# Patient Record
Sex: FEMALE | Race: WHITE
[De-identification: names, ages, dates, MRNs, and addresses within clinical notes are randomized per-mention and may not be internally consistent; named-entity substitution may affect disease eponyms.]

---

## 2020-09-10 ENCOUNTER — HOSPITAL ENCOUNTER (EMERGENCY)
Dept: HOSPITAL 4 - SED | Age: 25
Discharge: LEFT BEFORE DISCHARGE | End: 2020-09-10
Payer: COMMERCIAL

## 2020-09-10 VITALS — SYSTOLIC BLOOD PRESSURE: 161 MMHG

## 2020-09-10 VITALS — BODY MASS INDEX: 25.2 KG/M2 | WEIGHT: 180 LBS | HEIGHT: 71 IN

## 2020-09-10 DIAGNOSIS — R20.2: ICD-10-CM

## 2020-09-10 DIAGNOSIS — F15.129: Primary | ICD-10-CM

## 2020-09-10 DIAGNOSIS — F12.90: ICD-10-CM

## 2020-09-10 LAB
ALBUMIN SERPL BCP-MCNC: 4.4 G/DL (ref 3.4–4.8)
ALT SERPL W P-5'-P-CCNC: 42 U/L (ref 12–78)
ANION GAP SERPL CALCULATED.3IONS-SCNC: 11 MMOL/L (ref 5–15)
AST SERPL W P-5'-P-CCNC: 75 U/L (ref 10–37)
BASOPHILS # BLD AUTO: 0.1 K/UL (ref 0–0.2)
BASOPHILS NFR BLD AUTO: 0.6 % (ref 0–2)
BILIRUB SERPL-MCNC: 0.9 MG/DL (ref 0–1)
BUN SERPL-MCNC: 21 MG/DL (ref 8–21)
CALCIUM SERPL-MCNC: 9.5 MG/DL (ref 8.4–11)
CHLORIDE SERPL-SCNC: 100 MMOL/L (ref 98–107)
CREAT SERPL-MCNC: 1.17 MG/DL (ref 0.55–1.3)
EOSINOPHIL # BLD AUTO: 0 K/UL (ref 0–0.4)
EOSINOPHIL NFR BLD AUTO: 0.5 % (ref 0–4)
ERYTHROCYTE [DISTWIDTH] IN BLOOD BY AUTOMATED COUNT: 13.8 % (ref 9–15)
GFR SERPL CREATININE-BSD FRML MDRD: 74 ML/MIN (ref 90–?)
GLUCOSE SERPL-MCNC: 103 MG/DL (ref 70–99)
HCT VFR BLD AUTO: 38.8 % (ref 36–48)
HGB BLD-MCNC: 13.4 G/DL (ref 12–16)
LYMPHOCYTES # BLD AUTO: 3.2 K/UL (ref 1–5.5)
LYMPHOCYTES NFR BLD AUTO: 34.5 % (ref 20.5–51.5)
MCH RBC QN AUTO: 30 PG (ref 27–31)
MCHC RBC AUTO-ENTMCNC: 35 % (ref 32–36)
MCV RBC AUTO: 85 FL (ref 79–98)
MONOCYTES # BLD MANUAL: 1 K/UL (ref 0–1)
MONOCYTES # BLD MANUAL: 11 % (ref 1.7–9.3)
NEUTROPHILS # BLD AUTO: 4.9 K/UL (ref 1.8–7.7)
NEUTROPHILS NFR BLD AUTO: 53.4 % (ref 40–70)
PLATELET # BLD AUTO: 327 K/UL (ref 130–430)
POTASSIUM SERPL-SCNC: 3.6 MMOL/L (ref 3.5–5.1)
RBC # BLD AUTO: 4.54 MIL/UL (ref 4.2–6.2)
SODIUM SERPLBLD-SCNC: 136 MMOL/L (ref 136–145)
WBC # BLD AUTO: 9.2 K/UL (ref 4.8–10.8)

## 2020-12-15 ENCOUNTER — HOSPITAL ENCOUNTER (EMERGENCY)
Dept: HOSPITAL 4 - SED | Age: 25
Discharge: LEFT BEFORE DISCHARGE | End: 2020-12-15
Payer: MEDICAID

## 2020-12-15 VITALS — SYSTOLIC BLOOD PRESSURE: 136 MMHG | HEIGHT: 69 IN | BODY MASS INDEX: 22.51 KG/M2 | WEIGHT: 152 LBS

## 2020-12-15 DIAGNOSIS — Z13.30: Primary | ICD-10-CM

## 2020-12-15 DIAGNOSIS — Z53.21: ICD-10-CM

## 2020-12-25 ENCOUNTER — HOSPITAL ENCOUNTER (EMERGENCY)
Dept: HOSPITAL 4 - SED | Age: 25
Discharge: HOME | End: 2020-12-25
Payer: MEDICAID

## 2020-12-25 VITALS — SYSTOLIC BLOOD PRESSURE: 120 MMHG | WEIGHT: 177 LBS | BODY MASS INDEX: 26.83 KG/M2 | HEIGHT: 68 IN

## 2020-12-25 VITALS — SYSTOLIC BLOOD PRESSURE: 123 MMHG

## 2020-12-25 DIAGNOSIS — F10.129: ICD-10-CM

## 2020-12-25 DIAGNOSIS — Z79.899: ICD-10-CM

## 2020-12-25 DIAGNOSIS — R45.1: Primary | ICD-10-CM

## 2020-12-25 DIAGNOSIS — F15.90: ICD-10-CM

## 2020-12-25 DIAGNOSIS — Z20.828: ICD-10-CM

## 2020-12-25 LAB
ALBUMIN SERPL BCP-MCNC: 4.3 G/DL (ref 3.4–4.8)
ALT SERPL W P-5'-P-CCNC: 19 U/L (ref 12–78)
AMPHETAMINES UR QL SCN: NEGATIVE
ANION GAP SERPL CALCULATED.3IONS-SCNC: 16 MMOL/L (ref 5–15)
APAP SERPL-MCNC: < 1 UG/ML (ref 1–30)
APPEARANCE UR: CLEAR
AST SERPL W P-5'-P-CCNC: 20 U/L (ref 10–37)
BARBITURATES UR QL SCN: NEGATIVE
BASOPHILS # BLD AUTO: 0.1 K/UL (ref 0–0.2)
BASOPHILS NFR BLD AUTO: 0.9 % (ref 0–2)
BENZODIAZ UR QL SCN: NEGATIVE
BILIRUB SERPL-MCNC: 0.4 MG/DL (ref 0–1)
BILIRUB UR QL STRIP: NEGATIVE
BUN SERPL-MCNC: 11 MG/DL (ref 8–21)
BZE UR QL SCN: NEGATIVE
CALCIUM SERPL-MCNC: 9.1 MG/DL (ref 8.4–11)
CANNABINOIDS UR QL SCN: POSITIVE
CHLORIDE SERPL-SCNC: 105 MMOL/L (ref 98–107)
COLOR UR: YELLOW
CREAT SERPL-MCNC: 0.91 MG/DL (ref 0.55–1.3)
EOSINOPHIL # BLD AUTO: 0 K/UL (ref 0–0.4)
EOSINOPHIL NFR BLD AUTO: 0.4 % (ref 0–4)
ERYTHROCYTE [DISTWIDTH] IN BLOOD BY AUTOMATED COUNT: 13.2 % (ref 9–15)
ETHANOL SERPL-MCNC: 48 MG/DL (ref ?–10)
GFR SERPL CREATININE-BSD FRML MDRD: 98 ML/MIN (ref 90–?)
GLUCOSE SERPL-MCNC: 73 MG/DL (ref 70–99)
GLUCOSE UR STRIP-MCNC: NEGATIVE MG/DL
HCT VFR BLD AUTO: 36.2 % (ref 36–48)
HGB BLD-MCNC: 12.2 G/DL (ref 12–16)
HGB UR QL STRIP: NEGATIVE
KETONES UR STRIP-MCNC: NEGATIVE MG/DL
LEUKOCYTE ESTERASE UR QL STRIP: NEGATIVE
LYMPHOCYTES # BLD AUTO: 2 K/UL (ref 1–5.5)
LYMPHOCYTES NFR BLD AUTO: 30.7 % (ref 20.5–51.5)
MCH RBC QN AUTO: 29 PG (ref 27–31)
MCHC RBC AUTO-ENTMCNC: 34 % (ref 32–36)
MCV RBC AUTO: 86 FL (ref 79–98)
METHADONE UR-SCNC: NEGATIVE UMOL/L
METHAMPHET UR-SCNC: NEGATIVE UMOL/L
MONOCYTES # BLD MANUAL: 0.7 K/UL (ref 0–1)
MONOCYTES # BLD MANUAL: 10.1 % (ref 1.7–9.3)
NEUTROPHILS # BLD AUTO: 3.8 K/UL (ref 1.8–7.7)
NEUTROPHILS NFR BLD AUTO: 57.9 % (ref 40–70)
NITRITE UR QL STRIP: NEGATIVE
OPIATES UR QL SCN: NEGATIVE
OXYCODONE SERPL-MCNC: NEGATIVE NG/ML
PCP UR QL SCN: NEGATIVE
PH UR STRIP: 6 [PH] (ref 5–8)
PLATELET # BLD AUTO: 258 K/UL (ref 130–430)
POTASSIUM SERPL-SCNC: 3.4 MMOL/L (ref 3.5–5.1)
PROT UR QL STRIP: NEGATIVE
RBC # BLD AUTO: 4.2 MIL/UL (ref 4.2–6.2)
SODIUM SERPLBLD-SCNC: 143 MMOL/L (ref 136–145)
SP GR UR STRIP: <1.005 (ref 1–1.03)
TRICYCLICS UR-MCNC: NEGATIVE NG/ML
URINE PROPOXYPHENE SCREEN: NEGATIVE
UROBILINOGEN UR STRIP-MCNC: 0.2 MG/DL (ref 0.2–1)
WBC # BLD AUTO: 6.5 K/UL (ref 4.8–10.8)

## 2020-12-25 PROCEDURE — 80307 DRUG TEST PRSMV CHEM ANLYZR: CPT

## 2020-12-25 PROCEDURE — 70450 CT HEAD/BRAIN W/O DYE: CPT

## 2020-12-25 PROCEDURE — 87426 SARSCOV CORONAVIRUS AG IA: CPT

## 2020-12-25 PROCEDURE — 96374 THER/PROPH/DIAG INJ IV PUSH: CPT

## 2020-12-25 PROCEDURE — 85025 COMPLETE CBC W/AUTO DIFF WBC: CPT

## 2020-12-25 PROCEDURE — 80053 COMPREHEN METABOLIC PANEL: CPT

## 2020-12-25 PROCEDURE — 96375 TX/PRO/DX INJ NEW DRUG ADDON: CPT

## 2020-12-25 PROCEDURE — G0482 DRUG TEST DEF 15-21 CLASSES: HCPCS

## 2020-12-25 PROCEDURE — 96372 THER/PROPH/DIAG INJ SC/IM: CPT

## 2020-12-25 PROCEDURE — G0481 DRUG TEST DEF 8-14 CLASSES: HCPCS

## 2020-12-25 PROCEDURE — 81025 URINE PREGNANCY TEST: CPT

## 2020-12-25 PROCEDURE — 36415 COLL VENOUS BLD VENIPUNCTURE: CPT

## 2020-12-25 PROCEDURE — G0480 DRUG TEST DEF 1-7 CLASSES: HCPCS

## 2020-12-25 PROCEDURE — 82140 ASSAY OF AMMONIA: CPT

## 2020-12-25 PROCEDURE — 76376 3D RENDER W/INTRP POSTPROCES: CPT

## 2020-12-25 PROCEDURE — 81003 URINALYSIS AUTO W/O SCOPE: CPT

## 2020-12-25 PROCEDURE — 99285 EMERGENCY DEPT VISIT HI MDM: CPT

## 2020-12-25 NOTE — NUR
# 16 FR In and Out catheter with use of sterile technique. Immediate return of 
400 ml CLEAR YELLOW urine noted.  Urine sample collected and sent to lab. Pt 
tolerated procedure WELL.

## 2020-12-25 NOTE — NUR
PT BIB ALS AGITATED, RESTLESS, AND COMBATIVE. BOYFRIEND OF PATIENT CALLED 911. 
PT STATING "MY HEART HAS STOPPED, MY HEART HAS STOPPED". PT GIVEN VERSED 5MG 
IVP BY MEDICS PRIOR TO ARRIVAL TO ER. PT HX OF DRUG ABUSE.

## 2020-12-25 NOTE — NUR
VITAL SIGNS STABLE. PATIENT SLEEPING COMFORTABLY IN BED. BREATHING EVEN AND 
UNLABORED. NO SIGNS OF ACUTE DISTRESS. IV PATENT, NO SIGNS OF INFILTRATION. 
WILL CONTINUE TO MONITOR.

## 2022-07-22 ENCOUNTER — HOSPITAL ENCOUNTER (EMERGENCY)
Dept: HOSPITAL 4 - SED | Age: 27
Discharge: LEFT BEFORE DISCHARGE | End: 2022-07-22
Payer: COMMERCIAL

## 2022-07-22 VITALS — WEIGHT: 230 LBS | HEIGHT: 71 IN | BODY MASS INDEX: 32.2 KG/M2

## 2022-07-22 VITALS — SYSTOLIC BLOOD PRESSURE: 130 MMHG

## 2022-07-22 DIAGNOSIS — F10.129: Primary | ICD-10-CM
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
## 2022-07-22 DIAGNOSIS — Z79.899: ICD-10-CM

## 2022-07-22 LAB
ALBUMIN SERPL BCP-MCNC: 3.3 G/DL (ref 3.4–4.8)
ALT SERPL W P-5'-P-CCNC: 73 U/L (ref 12–78)
ANION GAP SERPL CALCULATED.3IONS-SCNC: 12 MMOL/L (ref 5–15)
AST SERPL W P-5'-P-CCNC: 46 U/L (ref 10–37)
BASOPHILS # BLD AUTO: 0.1 K/UL (ref 0–0.2)
BASOPHILS NFR BLD AUTO: 0.9 % (ref 0–2)
BILIRUB SERPL-MCNC: 0.3 MG/DL (ref 0–1)
BUN SERPL-MCNC: 14 MG/DL (ref 8–21)
CALCIUM SERPL-MCNC: 8.3 MG/DL (ref 8.4–11)
CHLORIDE SERPL-SCNC: 104 MMOL/L (ref 98–107)
CREAT SERPL-MCNC: 1.02 MG/DL (ref 0.55–1.3)
EOSINOPHIL # BLD AUTO: 0 K/UL (ref 0–0.4)
EOSINOPHIL NFR BLD AUTO: 0.2 % (ref 0–4)
ERYTHROCYTE [DISTWIDTH] IN BLOOD BY AUTOMATED COUNT: 13.2 % (ref 9–15)
GFR SERPL CREATININE-BSD FRML MDRD: 85 ML/MIN (ref 90–?)
GLUCOSE SERPL-MCNC: 98 MG/DL (ref 70–99)
HCG SERPL-ACNC: 1 MIU/ML (ref 0–6)
HCT VFR BLD AUTO: 38 % (ref 36–48)
HGB BLD-MCNC: 13.1 G/DL (ref 12–16)
LYMPHOCYTES # BLD AUTO: 2.5 K/UL (ref 1–5.5)
LYMPHOCYTES NFR BLD AUTO: 26.9 % (ref 20.5–51.5)
MCH RBC QN AUTO: 29 PG (ref 27–31)
MCHC RBC AUTO-ENTMCNC: 35 % (ref 32–36)
MCV RBC AUTO: 85 FL (ref 79–98)
MONOCYTES # BLD MANUAL: 0.4 K/UL (ref 0–1)
MONOCYTES # BLD MANUAL: 4.6 % (ref 1.7–9.3)
NEUTROPHILS # BLD AUTO: 6.3 K/UL (ref 1.8–7.7)
NEUTROPHILS NFR BLD AUTO: 67.4 % (ref 40–70)
PLATELET # BLD AUTO: 283 K/UL (ref 130–430)
POTASSIUM SERPL-SCNC: 3.6 MMOL/L (ref 3.5–5.1)
RBC # BLD AUTO: 4.46 MIL/UL (ref 4.2–6.2)
SODIUM SERPLBLD-SCNC: 138 MMOL/L (ref 136–145)
WBC # BLD AUTO: 9.3 K/UL (ref 4.8–10.8)

## 2022-07-22 PROCEDURE — 80053 COMPREHEN METABOLIC PANEL: CPT

## 2022-07-22 PROCEDURE — 96374 THER/PROPH/DIAG INJ IV PUSH: CPT

## 2022-07-22 PROCEDURE — 99283 EMERGENCY DEPT VISIT LOW MDM: CPT

## 2022-07-22 PROCEDURE — 36415 COLL VENOUS BLD VENIPUNCTURE: CPT

## 2022-07-22 PROCEDURE — 84702 CHORIONIC GONADOTROPIN TEST: CPT

## 2022-07-22 PROCEDURE — 85025 COMPLETE CBC W/AUTO DIFF WBC: CPT

## 2022-07-22 PROCEDURE — 96361 HYDRATE IV INFUSION ADD-ON: CPT
